# Patient Record
(demographics unavailable — no encounter records)

---

## 2024-11-13 NOTE — HISTORY OF PRESENT ILLNESS
[FreeTextEntry1] : Mr. RUPINDER ROBERTSON comes in today for a urologic evaluation, having seen another urologist.  He had a prostate MRI for a PSA elevation (see below).  From his general urologic history Mr. Yves Robertson reports a several year onset of moderate, stable, predominantly obstructive voiding symptoms (more pronounced in the morning) with nocturia x 1-2. IPSS: 10/3  Approximately 4 years ago he had a left inguinal herniorrhaphy for which he was hospitalized for 1 week (? postop ileus) and catheterized for several days.  His erections are reportedly normal.  There is no family history of prostate cancer.  PSAs:  2/5/24--7.05; 10/30/23--8.2; 5/17/22--5.2; 5/8/21--4.6  Creat:  2/5/24--0.8  MRI prostate: 4/13/24--PI-RADS 1 lesion.  138 cc prostate.  PSA density 0.06.  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  The right testicle is smaller than the left with microlithiasis.  ************** 11/13/24: Patient returns for follow up of his elevated PSA.  He is due for a PSA today.  MRI from April showed no suspicious lesions.    He also notes that his PCP started him on tamsulosin 0.4 mg daily last week for mild LUTS.  He sees mild improvement in morning hesitancy. PSA (2/29/24) = 8.48  MRI prostate: 4/13/24--PI-RADS 1 lesion.  138 cc prostate.  PSA density 0.06.  The small hydrocele and spermatocele do not bother him at this time.

## 2024-11-13 NOTE — ASSESSMENT
[FreeTextEntry1] : 58 yo male with elevated PSA and LUTS.  We will check his PSA today.  If stable, we will continue with semiannual PSA checks.    Lower urinary symptoms were reviewed including the potential etiologies of the patient's symptoms. The anatomy of the urinary tract was reviewed. Bladder, prostate, and urethral sources, as well as non-urologic sources, were discussed. Options for evaluation were discussed including cystoscopy, urodynamics, and pelvis ultrasonography. Management of symptoms were reviewed including no therapy, medical therapy, and surgical therapy.   Medical therapy for BPH (benign prostatic hyperplasia), or prostate enlargement, was reviewed including the physiology of medication therapy. Alpha blocker medication therapy was reviewed including adverse effects (including dizziness, hypotension, retrograde ejaculation, rhinitis, fatigue), proper usage, and contraindications.   I encouraged him to continue with tamsulosin 0.4 mg daily for his LUTS.    We will continue to observe the hydrocele and spermatocele.   Plan: 1. PSA today 2. Cont tamsulosin

## 2025-03-12 NOTE — HISTORY OF PRESENT ILLNESS
[FreeTextEntry1] : Mr. RUPINDER ROBERTSON comes in today for a urologic evaluation, having seen another urologist.  He had a prostate MRI for a PSA elevation (see below).  From his general urologic history Mr. Yves Robertson reports a several year onset of moderate, stable, predominantly obstructive voiding symptoms (more pronounced in the morning) with nocturia x 1-2. IPSS: 10/3  Approximately 4 years ago he had a left inguinal herniorrhaphy for which he was hospitalized for 1 week (? postop ileus) and catheterized for several days.  His erections are reportedly normal.  There is no family history of prostate cancer.  PSAs:  2/5/24--7.05; 10/30/23--8.2; 5/17/22--5.2; 5/8/21--4.6  Creat:  2/5/24--0.8  MRI prostate: 4/13/24--PI-RADS 1 lesion.  138 cc prostate.  PSA density 0.06.  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  The right testicle is smaller than the left with microlithiasis.  ************** 11/13/24: Patient returns for follow up of his elevated PSA.  He is due for a PSA today.  MRI from April showed no suspicious lesions.    He also notes that his PCP started him on tamsulosin 0.4 mg daily last week for mild LUTS.  He sees mild improvement in morning hesitancy. PSA (2/29/24) = 8.48  MRI prostate: 4/13/24--PI-RADS 1 lesion.  138 cc prostate.  PSA density 0.06.  The small hydrocele and spermatocele do not bother him at this time.   ************* 3/12/25: Patient returns for follow up.  His last PSA from 11/13/24 was 7.95 which was slightly lower than his previous PSA from 2/29/24 when it was 8.48.  He had a negative MRI on 4/13/24.  He had another PSA on 2/3/25 which was stable at 7.9.  He denies any bothersome LUTS.

## 2025-03-12 NOTE — ASSESSMENT
[FreeTextEntry1] : 56 yo male with elevated but stable PSA.  We discussed options to continue with PSA surveillance with MRIs as needed vs. systematic biopsy.  The patient prefers to continue with PSA surveillance. He will RTC in 6 months for his next PSA.